# Patient Record
Sex: FEMALE | Race: WHITE
[De-identification: names, ages, dates, MRNs, and addresses within clinical notes are randomized per-mention and may not be internally consistent; named-entity substitution may affect disease eponyms.]

---

## 2019-06-17 ENCOUNTER — HOSPITAL ENCOUNTER (OUTPATIENT)
Dept: HOSPITAL 53 - M SMT | Age: 29
End: 2019-06-17
Attending: OBSTETRICS & GYNECOLOGY
Payer: COMMERCIAL

## 2019-06-17 DIAGNOSIS — Z34.82: Primary | ICD-10-CM

## 2019-06-17 DIAGNOSIS — Z3A.00: ICD-10-CM

## 2019-06-17 LAB
CHLAMYDIA DNA AMPLIFICATION: NEGATIVE
HCT VFR BLD AUTO: 37.8 % (ref 36–47)
HGB BLD-MCNC: 12.4 G/DL (ref 12–15.5)
MCH RBC QN AUTO: 30.8 PG (ref 27–33)
MCHC RBC AUTO-ENTMCNC: 32.8 G/DL (ref 32–36.5)
MCV RBC AUTO: 94 FL (ref 80–96)
N GONORRHOEA RRNA SPEC QL NAA+PROBE: NEGATIVE
PLATELET # BLD AUTO: 265 10^3/UL (ref 150–450)
RBC # BLD AUTO: 4.02 10^6/UL (ref 4–5.4)
WBC # BLD AUTO: 13.7 10^3/UL (ref 4–10)

## 2019-08-15 ENCOUNTER — HOSPITAL ENCOUNTER (OUTPATIENT)
Dept: HOSPITAL 53 - M SMT | Age: 29
End: 2019-08-15
Attending: ADVANCED PRACTICE MIDWIFE
Payer: COMMERCIAL

## 2019-08-15 DIAGNOSIS — Z34.03: Primary | ICD-10-CM

## 2019-09-03 ENCOUNTER — HOSPITAL ENCOUNTER (INPATIENT)
Dept: HOSPITAL 53 - M LDO | Age: 29
LOS: 2 days | Discharge: HOME | End: 2019-09-05
Attending: ADVANCED PRACTICE MIDWIFE | Admitting: ADVANCED PRACTICE MIDWIFE
Payer: COMMERCIAL

## 2019-09-03 VITALS — WEIGHT: 244.27 LBS | BODY MASS INDEX: 38.34 KG/M2 | HEIGHT: 67 IN

## 2019-09-03 VITALS — DIASTOLIC BLOOD PRESSURE: 58 MMHG | SYSTOLIC BLOOD PRESSURE: 121 MMHG

## 2019-09-03 VITALS — DIASTOLIC BLOOD PRESSURE: 56 MMHG | SYSTOLIC BLOOD PRESSURE: 124 MMHG

## 2019-09-03 VITALS — SYSTOLIC BLOOD PRESSURE: 146 MMHG | DIASTOLIC BLOOD PRESSURE: 64 MMHG

## 2019-09-03 VITALS — DIASTOLIC BLOOD PRESSURE: 65 MMHG | SYSTOLIC BLOOD PRESSURE: 148 MMHG

## 2019-09-03 VITALS — DIASTOLIC BLOOD PRESSURE: 66 MMHG | SYSTOLIC BLOOD PRESSURE: 116 MMHG

## 2019-09-03 VITALS — DIASTOLIC BLOOD PRESSURE: 59 MMHG | SYSTOLIC BLOOD PRESSURE: 129 MMHG

## 2019-09-03 VITALS — SYSTOLIC BLOOD PRESSURE: 112 MMHG | DIASTOLIC BLOOD PRESSURE: 53 MMHG

## 2019-09-03 VITALS — SYSTOLIC BLOOD PRESSURE: 121 MMHG | DIASTOLIC BLOOD PRESSURE: 71 MMHG

## 2019-09-03 VITALS — SYSTOLIC BLOOD PRESSURE: 110 MMHG | DIASTOLIC BLOOD PRESSURE: 82 MMHG

## 2019-09-03 VITALS — SYSTOLIC BLOOD PRESSURE: 113 MMHG | DIASTOLIC BLOOD PRESSURE: 52 MMHG

## 2019-09-03 VITALS — DIASTOLIC BLOOD PRESSURE: 73 MMHG | SYSTOLIC BLOOD PRESSURE: 127 MMHG

## 2019-09-03 VITALS — DIASTOLIC BLOOD PRESSURE: 61 MMHG | SYSTOLIC BLOOD PRESSURE: 134 MMHG

## 2019-09-03 VITALS — DIASTOLIC BLOOD PRESSURE: 69 MMHG | SYSTOLIC BLOOD PRESSURE: 131 MMHG

## 2019-09-03 DIAGNOSIS — Z3A.39: ICD-10-CM

## 2019-09-03 LAB
HCT VFR BLD AUTO: 40.8 % (ref 36–47)
HGB BLD-MCNC: 13.9 G/DL (ref 12–15.5)
MCH RBC QN AUTO: 31.1 PG (ref 27–33)
MCHC RBC AUTO-ENTMCNC: 34.1 G/DL (ref 32–36.5)
MCV RBC AUTO: 91.3 FL (ref 80–96)
PLATELET # BLD AUTO: 227 10^3/UL (ref 150–450)
RBC # BLD AUTO: 4.47 10^6/UL (ref 4–5.4)
WBC # BLD AUTO: 18.3 10^3/UL (ref 4–10)

## 2019-09-03 PROCEDURE — 0KQM0ZZ REPAIR PERINEUM MUSCLE, OPEN APPROACH: ICD-10-PCS | Performed by: ADVANCED PRACTICE MIDWIFE

## 2019-09-03 RX ADMIN — BUTORPHANOL TARTRATE PRN MG: 2 INJECTION, SOLUTION INTRAMUSCULAR; INTRAVENOUS at 17:01

## 2019-09-03 RX ADMIN — BUTORPHANOL TARTRATE PRN MG: 2 INJECTION, SOLUTION INTRAMUSCULAR; INTRAVENOUS at 20:24

## 2019-09-03 NOTE — IPNPDOC
Obstetrical Progress Note


Date of Service


Sep 3, 2019





Subjective


Patient reports increased vaginal pressure.





Objective





Vital Signs








  Date Time  Temp Pulse Resp B/P (MAP) Pulse Ox O2 Delivery O2 Flow Rate FiO2


 


9/3/19 13:44  108 20 131/69 (89)    


 


9/3/19 12:23 98.2       











Fetal Assessment


Fetal Heart Rate (FHR):  135


Variability:  Moderate


Accelerations:  Positive


Decelerations:  Early


Fetal Heart Rate Tracing:  Category I





Tocometer


Contractions:  Yes


Frequency:  regular





Sterile Vaginal Examination


Dilation:  5 cm


Effacement (%):  90%


Station:  0


Cervical Consistency:  Soft


Cervical Position:  Anterior


Fetal Postion/Presentation:  Cephalic presentation





Assessment and Plan


EGA at Admission:  39.5


Fetal Status:  Reassuring


Group B Streptococcus:  Negative


Anticipate:  Vaginal Delivery


Additional Comments


Reviewed increased risk of infection after 24 hours due to SROM. Patient offered

IV Pitocin due to minimal cervical change. She and  will discuss and 

decide if she desires this.











TERRA KIM CNM             Sep 3, 2019 15:10

## 2019-09-03 NOTE — IPNPDOC
Obstetrical Progress Note


Date of Service


Sep 3, 2019





Subjective


Patient reports pressure and feels like she has to urinate every time she has a 

contraction.





Objective





Vital Signs








  Date Time  Temp Pulse Resp B/P (MAP) Pulse Ox O2 Delivery O2 Flow Rate FiO2


 


9/3/19 17:49  96 20 121/71 (88)    


 


9/3/19 17:20 97.8       











Fetal Assessment


Fetal Heart Rate (FHR):  135


Variability:  Moderate


Accelerations:  Positive


Decelerations:  None


Fetal Heart Rate Tracing:  Category I





Tocometer


Contractions:  Yes


Frequency:  other (2-6 minutes)





Sterile Vaginal Examination


Dilation:  6 cm


Effacement (%):  100%


Station:  0


Fetal Postion/Presentation:  Cephalic presentation





Assessment and Plan


EGA at Admission:  39.5


Fetal Status:  Reassuring


Group B Streptococcus:  Negative


Anticipate:  Vaginal Delivery


Additional Comments


Offered IV Pitocin due to small amount of cervical change. Patient will decide 

once  gets back from eating.











TERRA KIM CNM             Sep 3, 2019 18:58

## 2019-09-03 NOTE — HPE
DATE OF ADMISSION:  2019

 

HISTORY OF PRESENT ILLNESS:

The patient is a 29-year-old female who is a  1, para 0, at 39 weeks and 5

days gestation with an expected date of delivery (FARZAD) of 2019, based off

her last menstrual period (LMP) and consistent with her first trimester

ultrasound.  The patient initiated care in [facility], and transferred care

between 26 and 28 weeks to [facility].  Her pregnancy has been

uncomplicated.  She presents to labor and delivery with complaints of

contractions that are every 4 minutes and spontaneous rupture of membrane at 2300

of clear fluid on 2019.  She reports active fetal movement.  She denies

vaginal bleeding.

 

MEDICAL HISTORY:

No problems.

 

SURGICAL HISTORY:

Oral surgery.  Patient has dental implants.

 

FAMILY HISTORY:

Diabetes, heart disease, high blood pressure, hypothyroid, lung cancer.

 

SOCIAL HISTORY:

Patient is .  She is unemployed.  She has a master's degree.  She denies

being a smoker.  She denies any history of alcohol abuse or use during pregnancy

or illicit drug abuse or use during pregnancy or prior to pregnancy.  She denies

history of any sexually transmitted infections.

 

PRENATAL LABS:

Blood type is A positive.  Her antibody screen is negative.  Her hemoglobin and

hematocrit is 13.8 and 40.6 with platelets of 310 in the first trimester.

Rubella is immune.  VDRL is nonreactive.  Urine culture is no growth.  Hepatitis

B surface antigen is negative.  HIV is negative.  Hepatitis C is nonreactive.

Gonorrhea and Chlamydia are both negative.  She is not a carrier for cystic

fibrosis, SMA which is spinomuscular atrophy or fragile X syndrome.  Her one hour

glucose tolerance test was 90 with hemoglobin and hematocrit of 12.4 and 37.8

with platelets of 265.  She is GBS negative.  Her third trimester HIV was

negative.

 

Fetal heart rate 130, moderate variability, positive accelerations, one

deceleration noted, a 4 minute decel when patient arrived to the unit.  Since

then she has had a category I fetal heart rate tracing after an fetal scalp

electrode (FSE) was placed.  Contractions are every 2 to 7 minutes.

 

VITAL SIGNS:  Temperature 98.2, pulse 83, respiratory rate 20, blood pressure

113/52.

The patient had positive Nitrazine with a lot of moisture in the vaginal area and

positive clear leaking of fluid with vaginal exam.

Vaginal exam upon initial arrival was 2-3, 50% effaced, -2 station.  A few hours

after when I assessed, she was 4 cm dilated, 90% effaced and 0 station with

continuous clear liquid fluid draining from the vagina.

 

PHYSICAL ASSESSMENT:

General:  Alert and oriented times three.

Respiratory:  Regular rate and rhythm with no use of accessory muscles.

Cardiovascular:  No murmur.  No rubs.  No gallops.  Regular rate.

Abdomen:  Gravid.  Nontender to touch.  Contractions to palpate moderately.

Cephalic presentation noted via Leopold and vaginal exam.

Lower Extremities:  No pitting edema.  No clonus.

 

ASSESSMENT:

Intrauterine pregnancy at 39.5 weeks gestation, active labor, spontaneous rupture

of membrane, Category 1 fetal heart rate tracing.

 

PLAN:

Admit patient to labor and delivery.  Saline lock and labs per unit protocol.

Out of bed ad michael.  Intermittent monitoring per patient's request.  FSE was

placed due to difficulty monitoring fetus.  The patient desires nature childbirth

with minimal intervention. Risks reviewed with patient and  about the four

minute deceleration and need for potential more frequent monitoring.  The patient

and  verbalized understanding.  Anesthesia consult per patient's request

if she desires.  Anticipate cervical change and spontaneous vaginal delivery.

## 2019-09-04 VITALS — DIASTOLIC BLOOD PRESSURE: 57 MMHG | SYSTOLIC BLOOD PRESSURE: 113 MMHG

## 2019-09-04 VITALS — DIASTOLIC BLOOD PRESSURE: 59 MMHG | SYSTOLIC BLOOD PRESSURE: 129 MMHG

## 2019-09-04 VITALS — DIASTOLIC BLOOD PRESSURE: 62 MMHG | SYSTOLIC BLOOD PRESSURE: 122 MMHG

## 2019-09-04 RX ADMIN — ACETAMINOPHEN PRN MG: 325 TABLET ORAL at 18:30

## 2019-09-04 RX ADMIN — ACETAMINOPHEN PRN MG: 325 TABLET ORAL at 12:33

## 2019-09-04 RX ADMIN — Medication SCH TAB: at 08:12

## 2019-09-04 NOTE — DN
DELIVERY TIME AND DATE:  2019 at 2111.

 

STATUS: Delivered via vaginal delivery.

 

ANESTHESIA:  None.

 

PROVIDER:  Susan Murdock CNM, PRISCILLA

 

ESTIMATED BLOOD LOSS:  400 mL.

 

FINDINGS:  Female, 7 pounds, 13 ounces, 3530 grams, Apgars 8/9, nuchal cord 
times

one tight.

 

Patient is a 29-year-old female who is now a  1, para 0-0-1 at 39 weeks 5

days gestation who presented to labor and delivery with complaints of 
spontaneous

rupture of membranes on 2019 at 2300 in active labor.  The patient

progressed to fully dilated at 2101.  She received a very small amount of IV

pitocin and Stadol and Phenergan for pain management.  She pushed to a living

female in the left occiput anterior (VALERIA) position, restitution to left occiput

transverse (LOT).  Nuchal cord times one tight was noted.   The anterior 
shoulder

delivered with ease and the corpus immediately followed.  The baby was placed on

the maternal abdomen, active and crying with stimulation.  The cord was clamped

times two after pulsation ceased and cut by the father of the baby.  A

three-vessel cord was noted.

 

The placenta delivered spontaneously and intact at 0.  Uterine hemostasis was

achieved via rapid infusion of IV pitocin and fundal massage.  The peroneum,

cervix, and vaginal was inspected and found to have a second degree peroneal

laceration that was repaired with a #3-0 Vicryl Rapide CT-1 till good hemostasis

was achieved.

 

The mom plans to breast feed her .  They plan on naming her Estela.

Both mom and baby are in stable condition.  All counts of instruments and 
sponges

are correct.

MTDD

## 2019-09-04 NOTE — IPNPDOC
Text Note


Date of Service


The patient was seen on 19.





NOTE


Postpartum Day 1 s/p ; uncomplicated


 


S: pain well controlled, lochia and bleeding decreasing, voiding spontaneously,

ambulating without assistance, tolerating regular diet. Breast feeding with 

supplementation.





O: vitals stable


Heart: RRR, no murmurs


Lungs: CTA bilaterally


Abd: fundus firm at U-1


Ext: no edema, nontender, negative Alejandro's bilaterally


 


A/P: 30 yo G1 now P1. Postpartum day 1 s/p  Hemodynamically stable, 

afebrile, adequate pain control. Recovering well.


-Routine postpartum care and advancement.


-Anticipate discharge tomorrow





VS,Fishbone, I+O


VS, Fishbone, I+O


Laboratory Tests


9/3/19 11:12








Red Blood Count 4.47, Mean Corpuscular Volume 91.3, Mean Corpuscular Hemoglobin 

31.1, Mean Corpuscular Hemoglobin Concent 34.1, Red Cell Distribution Width 13.4








Vital Signs








  Date Time  Temp Pulse Resp B/P (MAP) Pulse Ox O2 Delivery O2 Flow Rate FiO2


 


19 05:34 99.7 109 20 122/62 (82)    














I&O- Last 24 Hours up to 6 AM 


 


 19





 05:59


 


Intake Total 2000 ml


 


Output Total 400 ml


 


Balance 1600 ml











GME ATTESTATION


E ATTESTATION


My faculty preceptor for this patient encounter was physically present during 

the encounter and was fully available. All aspects of the patient interview, 

examination, medical decision making process, and medical care plan development 

were reviewed and approved by the faculty preceptor. The faculty preceptor is 

aware and concurs with the plan as stated in the body of this note and will 

attest to such by his/her cosignature.











CASPER MEJIA DO              Sep 4, 2019 06:19

## 2019-09-05 VITALS — SYSTOLIC BLOOD PRESSURE: 100 MMHG | DIASTOLIC BLOOD PRESSURE: 59 MMHG

## 2019-09-05 RX ADMIN — Medication SCH TAB: at 09:50

## 2019-10-18 ENCOUNTER — HOSPITAL ENCOUNTER (OUTPATIENT)
Dept: HOSPITAL 53 - M SMT | Age: 29
End: 2019-10-18
Attending: ADVANCED PRACTICE MIDWIFE
Payer: COMMERCIAL

## 2019-10-18 DIAGNOSIS — Z83.49: Primary | ICD-10-CM

## 2019-10-18 LAB
T4 FREE SERPL-MCNC: 1.01 NG/DL (ref 0.76–1.46)
TSH SERPL DL<=0.005 MIU/L-ACNC: 1.15 UIU/ML (ref 0.36–3.74)

## 2019-12-19 ENCOUNTER — HOSPITAL ENCOUNTER (OUTPATIENT)
Dept: HOSPITAL 53 - M SFHCWAGY | Age: 29
End: 2019-12-19
Attending: ADVANCED PRACTICE MIDWIFE
Payer: COMMERCIAL

## 2019-12-19 DIAGNOSIS — Z01.419: Primary | ICD-10-CM

## 2019-12-19 PROCEDURE — 87624 HPV HI-RISK TYP POOLED RSLT: CPT

## 2020-08-20 ENCOUNTER — HOSPITAL ENCOUNTER (OUTPATIENT)
Dept: HOSPITAL 53 - M PLALAB | Age: 30
End: 2020-08-20
Attending: OBSTETRICS & GYNECOLOGY
Payer: COMMERCIAL

## 2020-08-20 DIAGNOSIS — O20.0: Primary | ICD-10-CM

## 2020-08-20 DIAGNOSIS — Z3A.00: ICD-10-CM

## 2020-08-22 ENCOUNTER — HOSPITAL ENCOUNTER (OUTPATIENT)
Dept: HOSPITAL 53 - M LAB | Age: 30
End: 2020-08-22
Attending: ADVANCED PRACTICE MIDWIFE
Payer: COMMERCIAL

## 2020-08-22 DIAGNOSIS — Z3A.00: ICD-10-CM

## 2020-08-22 DIAGNOSIS — Z34.80: Primary | ICD-10-CM

## 2020-08-27 ENCOUNTER — HOSPITAL ENCOUNTER (OUTPATIENT)
Dept: HOSPITAL 53 - M PLALAB | Age: 30
End: 2020-08-27
Attending: ADVANCED PRACTICE MIDWIFE
Payer: COMMERCIAL

## 2020-08-27 DIAGNOSIS — O03.9: Primary | ICD-10-CM

## 2020-10-22 ENCOUNTER — HOSPITAL ENCOUNTER (OUTPATIENT)
Dept: HOSPITAL 53 - M PLALAB | Age: 30
End: 2020-10-22
Attending: ADVANCED PRACTICE MIDWIFE
Payer: COMMERCIAL

## 2020-10-22 DIAGNOSIS — O99.211: Primary | ICD-10-CM

## 2020-10-22 LAB
EST. AVERAGE GLUCOSE BLD GHB EST-MCNC: 103 MG/DL (ref 60–110)
GLUCOSE 1H P 50 G GLC PO SERPL-MCNC: 56 MG/DL (ref ?–140)
HCT VFR BLD AUTO: 45.7 % (ref 36–47)
HCV AB SER QL: 0.1 INDEX (ref ?–0.8)
HGB BLD-MCNC: 15 G/DL (ref 12–15.5)
HIV 1+2 AB+HIV1 P24 AG SERPL QL IA: NEGATIVE
MCH RBC QN AUTO: 30.4 PG (ref 27–33)
MCHC RBC AUTO-ENTMCNC: 32.8 G/DL (ref 32–36.5)
MCV RBC AUTO: 92.5 FL (ref 80–96)
PLATELET # BLD AUTO: 297 10^3/UL (ref 150–450)
RBC # BLD AUTO: 4.94 10^6/UL (ref 4–5.4)
WBC # BLD AUTO: 10.1 10^3/UL (ref 4–10)

## 2020-10-27 ENCOUNTER — HOSPITAL ENCOUNTER (OUTPATIENT)
Dept: HOSPITAL 53 - M SFHCWAGY | Age: 30
End: 2020-10-27
Attending: ADVANCED PRACTICE MIDWIFE
Payer: COMMERCIAL

## 2020-10-27 DIAGNOSIS — Z11.3: Primary | ICD-10-CM

## 2020-10-27 PROCEDURE — 87591 N.GONORRHOEAE DNA AMP PROB: CPT

## 2020-10-27 PROCEDURE — 87491 CHLMYD TRACH DNA AMP PROBE: CPT

## 2020-10-28 LAB
CHLAMYDIA DNA AMPLIFICATION: NEGATIVE
N GONORRHOEA RRNA SPEC QL NAA+PROBE: NEGATIVE

## 2020-11-20 ENCOUNTER — HOSPITAL ENCOUNTER (OUTPATIENT)
Dept: HOSPITAL 53 - M PLALAB | Age: 30
End: 2020-11-20
Attending: ADVANCED PRACTICE MIDWIFE
Payer: COMMERCIAL

## 2020-11-20 DIAGNOSIS — Z34.82: Primary | ICD-10-CM

## 2020-11-20 DIAGNOSIS — Z36.89: ICD-10-CM

## 2020-12-03 ENCOUNTER — HOSPITAL ENCOUNTER (OUTPATIENT)
Dept: HOSPITAL 53 - M PLALAB | Age: 30
End: 2020-12-03
Attending: ADVANCED PRACTICE MIDWIFE
Payer: COMMERCIAL

## 2020-12-03 DIAGNOSIS — Z13.79: Primary | ICD-10-CM

## 2021-01-04 ENCOUNTER — HOSPITAL ENCOUNTER (OUTPATIENT)
Dept: HOSPITAL 53 - M WHC | Age: 31
End: 2021-01-04
Attending: OBSTETRICS & GYNECOLOGY
Payer: COMMERCIAL

## 2021-01-04 DIAGNOSIS — Z3A.19: ICD-10-CM

## 2021-01-04 DIAGNOSIS — Z34.82: Primary | ICD-10-CM

## 2021-01-04 NOTE — REP
INDICATION:

ANATOMY.



COMPARISON:

None.



TECHNIQUE:

Transabdominal obstetric sonography:



FINDINGS:

Scanning through the gravid uterus demonstrates a viable single intrauterine gestation

in transverse, head to the maternal right fetal lie.  Fetal motion is observed and

fetal heart rate is recorded at 158 beats per minute.  A anterior placenta is seen,

grade 1, without evidence of placenta previa.  Amniotic fluid is subjectively normal.

Closed cervical length is measured at 4.2 cm transabdominally.  No extrauterine

abnormality is observed.  Amniotic fluid is subjectively normal.



No fetal anomaly is seen.  The following fetal anatomic structures are identified and

felt to be sonographically unremarkable:  Fetal cranium, choroid plexus, cavum,

cerebellum and posterior fossa, face and profile, lungs, left and right ventricular

outflow tract views, diaphragm, left-sided stomach, abdominal wall cord insertion,

three-vessel umbilical cord, kidneys and bladder, spine, and upper and lower

extremities.  Four-chamber heart visualization was less than optimally achieved due to

fetal position today.



Biometry chart:

BPD 4.3 cm, 18 weeks 6 days

Head circumference 16.1 cm, 18 weeks 6 days

Abdominal circumference 14.4 cm, 19 weeks 5 days

Femur length 3.0 cm, 19 weeks 1 day

Humeral length 3.0 cm, 19 weeks 5 days

HC AC ratio normal 1.12

Cephalic index normal 0.73

Estimated fetal weight 290 g, 0 lb 10 oz, 21st percentile for 19 weeks 6 days



IMPRESSION:

Viable single intrauterine gestation at 19 weeks 2 days by today's composite

sonographic criteria.  FARZAD by today's sonography May 29, 2021..  No complication

identified.



Four-chamber heart view less than optimally visualized today due to fetal position.





<Electronically signed by Chas Chan > 01/04/21 1103

## 2021-01-12 ENCOUNTER — HOSPITAL ENCOUNTER (OUTPATIENT)
Dept: HOSPITAL 53 - M LAB REF | Age: 31
End: 2021-01-12
Attending: OBSTETRICS & GYNECOLOGY
Payer: COMMERCIAL

## 2021-01-12 DIAGNOSIS — Z34.83: Primary | ICD-10-CM

## 2021-01-12 DIAGNOSIS — Z3A.00: ICD-10-CM

## 2021-02-01 ENCOUNTER — HOSPITAL ENCOUNTER (OUTPATIENT)
Dept: HOSPITAL 53 - M WHC | Age: 31
End: 2021-02-01
Attending: ADVANCED PRACTICE MIDWIFE
Payer: COMMERCIAL

## 2021-02-01 DIAGNOSIS — Z3A.23: ICD-10-CM

## 2021-02-01 DIAGNOSIS — Z36.2: Primary | ICD-10-CM

## 2021-02-01 NOTE — REP
INDICATION:

F/U ANATOMY.



COMPARISON:

Comparison obstetric sonography is from January 4, 2021..



TECHNIQUE:

Transabdominal obstetric sonography.



FINDINGS:

Scanning through the gravid uterus demonstrates a viable single intrauterine gestation

in cephalic fetal lie.  Fetal motion is observed and fetal heart rate is recorded at

153 beats per minute.  A anterior placenta is seen, grade 1, without evidence of

placenta previa. Closed cervical length is measured at 3.3 cm transabdominally.  No

extrauterine abnormality is observed.  Amniotic fluid is subjectively normal.



The following fetal anatomic structures are identified today and felt to be

unremarkable: Fetal cranium, face and profile, four-chamber heart with left and right

ventricular outflow tract views, left-sided stomach, kidneys and urinary bladder,

three-vessel cord..



Biometry chart:

BPD 5.5 cm, 22 weeks 6 days

Head circumference 20.7 cm, 22 weeks 5 days

Abdominal circumference 18.2 cm, 23 weeks 0 days,

Femur length 4.2 cm, 23 weeks 4 days

Humeral length 4.0 cm, 24 weeks 1 day

HC AC ratio normal 1.14

Cephalic index normal 0.74

Estimated fetal weight 572 g, 1 lb 4 oz, 16th percentile for 23 weeks 4 days



IMPRESSION:

Viable single intrauterine gestation at 23 weeks 2 days by today's composite

sonographic criteria.  FARZAD by today's sonography 05/29/2021.  No complication

identified.



Expected gestational age estimate based on prior sonography is 23 weeks 6 days FARZAD by

prior sonography May 25, 2021.  In conjunction with the prior study, fetal anatomic

survey is felt to be complete.





<Electronically signed by Chas Chan > 02/01/21 0822

## 2021-03-03 ENCOUNTER — HOSPITAL ENCOUNTER (OUTPATIENT)
Dept: HOSPITAL 53 - M LAB | Age: 31
End: 2021-03-03
Attending: OBSTETRICS & GYNECOLOGY
Payer: COMMERCIAL

## 2021-03-03 DIAGNOSIS — Z34.82: Primary | ICD-10-CM

## 2021-03-03 DIAGNOSIS — Z3A.00: ICD-10-CM

## 2021-03-03 LAB
HCT VFR BLD AUTO: 39.3 % (ref 36–47)
HGB BLD-MCNC: 13 G/DL (ref 12–15.5)
MCH RBC QN AUTO: 30.7 PG (ref 27–33)
MCHC RBC AUTO-ENTMCNC: 33.1 G/DL (ref 32–36.5)
MCV RBC AUTO: 92.9 FL (ref 80–96)
PLATELET # BLD AUTO: 247 10^3/UL (ref 150–450)
RBC # BLD AUTO: 4.23 10^6/UL (ref 4–5.4)
WBC # BLD AUTO: 11.2 10^3/UL (ref 4–10)

## 2021-03-09 ENCOUNTER — HOSPITAL ENCOUNTER (OUTPATIENT)
Dept: HOSPITAL 53 - M LAB REF | Age: 31
End: 2021-03-09
Attending: ADVANCED PRACTICE MIDWIFE
Payer: COMMERCIAL

## 2021-03-09 DIAGNOSIS — Z34.82: Primary | ICD-10-CM

## 2021-04-20 ENCOUNTER — HOSPITAL ENCOUNTER (OUTPATIENT)
Dept: HOSPITAL 53 - M LAB REF | Age: 31
End: 2021-04-20
Attending: OBSTETRICS & GYNECOLOGY
Payer: COMMERCIAL

## 2021-04-20 DIAGNOSIS — Z36.89: Primary | ICD-10-CM

## 2021-04-20 DIAGNOSIS — Z34.83: ICD-10-CM
